# Patient Record
Sex: FEMALE | Race: WHITE | NOT HISPANIC OR LATINO | Employment: OTHER | URBAN - METROPOLITAN AREA
[De-identification: names, ages, dates, MRNs, and addresses within clinical notes are randomized per-mention and may not be internally consistent; named-entity substitution may affect disease eponyms.]

---

## 2017-01-19 ENCOUNTER — FOLLOW UP (OUTPATIENT)
Dept: URBAN - METROPOLITAN AREA CLINIC 26 | Facility: CLINIC | Age: 77
End: 2017-01-19

## 2017-01-19 VITALS
WEIGHT: 180 LBS | HEIGHT: 64 IN | BODY MASS INDEX: 30.73 KG/M2 | HEART RATE: 80 BPM | SYSTOLIC BLOOD PRESSURE: 128 MMHG | DIASTOLIC BLOOD PRESSURE: 62 MMHG

## 2017-01-19 DIAGNOSIS — H40.1130: ICD-10-CM

## 2017-01-19 DIAGNOSIS — H43.813: ICD-10-CM

## 2017-01-19 DIAGNOSIS — Z79.4: ICD-10-CM

## 2017-01-19 DIAGNOSIS — E11.3313: ICD-10-CM

## 2017-01-19 PROCEDURE — 92235 FLUORESCEIN ANGRPH MLTIFRAME: CPT

## 2017-01-19 PROCEDURE — 92250 FUNDUS PHOTOGRAPHY W/I&R: CPT

## 2017-01-19 PROCEDURE — 92014 COMPRE OPH EXAM EST PT 1/>: CPT

## 2017-01-19 PROCEDURE — G8397 DIL MACULA/FUNDUS EXAM/W DOC: HCPCS

## 2017-01-19 PROCEDURE — G8417 CALC BMI ABV UP PARAM F/U: HCPCS

## 2017-01-19 PROCEDURE — 2021F DILAT MACULAR EXAM DONE: CPT

## 2017-01-19 PROCEDURE — 1036F TOBACCO NON-USER: CPT

## 2017-01-19 PROCEDURE — 2027F OPTIC NERVE HEAD EVAL DONE: CPT

## 2017-01-19 PROCEDURE — 5010F MACUL RESULT PHY/QHP MNG DM: CPT

## 2017-01-19 PROCEDURE — G8427 DOCREV CUR MEDS BY ELIG CLIN: HCPCS

## 2017-01-19 PROCEDURE — 67210 TREATMENT OF RETINAL LESION: CPT

## 2017-01-19 PROCEDURE — 2022F DILAT RTA XM EVC RTNOPTHY: CPT

## 2017-01-19 ASSESSMENT — TONOMETRY
OD_IOP_MMHG: 11
OS_IOP_MMHG: 12

## 2017-01-19 ASSESSMENT — VISUAL ACUITY
OD_SC: 20/40+2
OS_SC: 20/40+2

## 2017-07-13 ENCOUNTER — FOLLOW UP (OUTPATIENT)
Dept: URBAN - METROPOLITAN AREA CLINIC 26 | Facility: CLINIC | Age: 77
End: 2017-07-13

## 2017-07-13 VITALS — HEIGHT: 55 IN | HEART RATE: 71 BPM | DIASTOLIC BLOOD PRESSURE: 60 MMHG | SYSTOLIC BLOOD PRESSURE: 143 MMHG

## 2017-07-13 DIAGNOSIS — E11.3313: ICD-10-CM

## 2017-07-13 DIAGNOSIS — H02.833: ICD-10-CM

## 2017-07-13 DIAGNOSIS — H43.813: ICD-10-CM

## 2017-07-13 DIAGNOSIS — H02.836: ICD-10-CM

## 2017-07-13 DIAGNOSIS — Z79.4: ICD-10-CM

## 2017-07-13 DIAGNOSIS — H40.1130: ICD-10-CM

## 2017-07-13 PROCEDURE — 92235 FLUORESCEIN ANGRPH MLTIFRAME: CPT

## 2017-07-13 PROCEDURE — 1036F TOBACCO NON-USER: CPT

## 2017-07-13 PROCEDURE — G8397 DIL MACULA/FUNDUS EXAM/W DOC: HCPCS

## 2017-07-13 PROCEDURE — 2022F DILAT RTA XM EVC RTNOPTHY: CPT

## 2017-07-13 PROCEDURE — G8427 DOCREV CUR MEDS BY ELIG CLIN: HCPCS

## 2017-07-13 PROCEDURE — 2027F OPTIC NERVE HEAD EVAL DONE: CPT

## 2017-07-13 PROCEDURE — G8417 CALC BMI ABV UP PARAM F/U: HCPCS

## 2017-07-13 PROCEDURE — 92014 COMPRE OPH EXAM EST PT 1/>: CPT

## 2017-07-13 PROCEDURE — 5010F MACUL RESULT PHY/QHP MNG DM: CPT

## 2017-07-13 PROCEDURE — 92134 CPTRZ OPH DX IMG PST SGM RTA: CPT

## 2017-07-13 PROCEDURE — 2021F DILAT MACULAR EXAM DONE: CPT

## 2017-07-13 PROCEDURE — 67210 TREATMENT OF RETINAL LESION: CPT

## 2017-07-13 PROCEDURE — 92250 FUNDUS PHOTOGRAPHY W/I&R: CPT

## 2017-07-13 ASSESSMENT — VISUAL ACUITY
OD_SC: 20/30+2
OS_SC: 20/25+2

## 2017-07-13 ASSESSMENT — TONOMETRY
OD_IOP_MMHG: 15
OS_IOP_MMHG: 14

## 2017-08-17 ENCOUNTER — CLINIC PROCEDURE ONLY (OUTPATIENT)
Dept: URBAN - METROPOLITAN AREA CLINIC 26 | Facility: CLINIC | Age: 77
End: 2017-08-17

## 2017-08-17 VITALS — SYSTOLIC BLOOD PRESSURE: 130 MMHG | DIASTOLIC BLOOD PRESSURE: 78 MMHG | HEIGHT: 55 IN | HEART RATE: 63 BPM

## 2017-08-17 DIAGNOSIS — E11.3313: ICD-10-CM

## 2017-08-17 PROCEDURE — 67210 TREATMENT OF RETINAL LESION: CPT

## 2017-08-17 ASSESSMENT — TONOMETRY
OS_IOP_MMHG: 13
OD_IOP_MMHG: 14

## 2017-08-17 ASSESSMENT — VISUAL ACUITY
OS_SC: 20/40-1
OD_SC: 20/25-2

## 2018-02-16 ENCOUNTER — FOLLOW UP (OUTPATIENT)
Dept: URBAN - METROPOLITAN AREA CLINIC 26 | Facility: CLINIC | Age: 78
End: 2018-02-16

## 2018-02-16 VITALS
HEIGHT: 64 IN | SYSTOLIC BLOOD PRESSURE: 160 MMHG | WEIGHT: 190 LBS | DIASTOLIC BLOOD PRESSURE: 87 MMHG | HEART RATE: 74 BPM | BODY MASS INDEX: 32.44 KG/M2

## 2018-02-16 DIAGNOSIS — E11.3313: ICD-10-CM

## 2018-02-16 DIAGNOSIS — H40.1130: ICD-10-CM

## 2018-02-16 DIAGNOSIS — Z79.4: ICD-10-CM

## 2018-02-16 DIAGNOSIS — H43.813: ICD-10-CM

## 2018-02-16 PROCEDURE — 92014 COMPRE OPH EXAM EST PT 1/>: CPT

## 2018-02-16 PROCEDURE — 92134 CPTRZ OPH DX IMG PST SGM RTA: CPT

## 2018-02-16 PROCEDURE — 67210 TREATMENT OF RETINAL LESION: CPT

## 2018-02-16 PROCEDURE — 92250 FUNDUS PHOTOGRAPHY W/I&R: CPT

## 2018-02-16 ASSESSMENT — VISUAL ACUITY
OD_SC: 20/20-2
OS_SC: 20/20+1

## 2018-02-16 ASSESSMENT — TONOMETRY
OS_IOP_MMHG: 14
OD_IOP_MMHG: 16

## 2018-05-31 ENCOUNTER — FOLLOW UP (OUTPATIENT)
Dept: URBAN - METROPOLITAN AREA CLINIC 26 | Facility: CLINIC | Age: 78
End: 2018-05-31

## 2018-05-31 DIAGNOSIS — E11.3313: ICD-10-CM

## 2018-05-31 DIAGNOSIS — H40.1130: ICD-10-CM

## 2018-05-31 DIAGNOSIS — Z79.4: ICD-10-CM

## 2018-05-31 DIAGNOSIS — H43.813: ICD-10-CM

## 2018-05-31 PROCEDURE — 92014 COMPRE OPH EXAM EST PT 1/>: CPT

## 2018-05-31 PROCEDURE — J2778** LUCENTIS 0.1MG

## 2018-05-31 PROCEDURE — 92250 FUNDUS PHOTOGRAPHY W/I&R: CPT

## 2018-05-31 PROCEDURE — 67028 INJECTION EYE DRUG: CPT

## 2018-05-31 PROCEDURE — 92134 CPTRZ OPH DX IMG PST SGM RTA: CPT

## 2018-05-31 ASSESSMENT — TONOMETRY
OS_IOP_MMHG: 13
OD_IOP_MMHG: 12

## 2018-05-31 ASSESSMENT — VISUAL ACUITY
OS_SC: 20/100-3
OD_SC: 20/30-1

## 2018-07-05 ENCOUNTER — FOLLOW UP AND POST INJECTION EVALUATION (OUTPATIENT)
Dept: URBAN - METROPOLITAN AREA CLINIC 26 | Facility: CLINIC | Age: 78
End: 2018-07-05

## 2018-07-05 DIAGNOSIS — Z79.4: ICD-10-CM

## 2018-07-05 DIAGNOSIS — H43.813: ICD-10-CM

## 2018-07-05 DIAGNOSIS — H40.1130: ICD-10-CM

## 2018-07-05 DIAGNOSIS — E11.3313: ICD-10-CM

## 2018-07-05 PROCEDURE — J2778** LUCENTIS 0.1MG

## 2018-07-05 PROCEDURE — 92250 FUNDUS PHOTOGRAPHY W/I&R: CPT

## 2018-07-05 PROCEDURE — 67028 INJECTION EYE DRUG: CPT

## 2018-07-05 PROCEDURE — 92014 COMPRE OPH EXAM EST PT 1/>: CPT

## 2018-07-05 PROCEDURE — 92134 CPTRZ OPH DX IMG PST SGM RTA: CPT

## 2018-07-05 ASSESSMENT — VISUAL ACUITY
OD_SC: 20/20
OS_PH: 20/400+2
OS_SC: 20/400

## 2018-07-05 ASSESSMENT — TONOMETRY
OS_IOP_MMHG: 11
OD_IOP_MMHG: 13

## 2018-08-10 ENCOUNTER — CLINICAL PROCEDURE AND DIAGNOSTIC TESTING ONLY (OUTPATIENT)
Dept: URBAN - METROPOLITAN AREA CLINIC 26 | Facility: CLINIC | Age: 78
End: 2018-08-10

## 2018-08-10 DIAGNOSIS — E11.3313: ICD-10-CM

## 2018-08-10 PROCEDURE — J2778** LUCENTIS 0.1MG

## 2018-08-10 PROCEDURE — 67028 INJECTION EYE DRUG: CPT

## 2018-08-10 PROCEDURE — 92134 CPTRZ OPH DX IMG PST SGM RTA: CPT

## 2018-08-10 ASSESSMENT — VISUAL ACUITY
OS_SC: 20/400
OD_SC: 20/25

## 2018-08-10 ASSESSMENT — TONOMETRY
OS_IOP_MMHG: 12
OD_IOP_MMHG: 12

## 2018-09-21 ENCOUNTER — CLINICAL PROCEDURE AND DIAGNOSTIC TESTING ONLY (OUTPATIENT)
Dept: URBAN - METROPOLITAN AREA CLINIC 26 | Facility: CLINIC | Age: 78
End: 2018-09-21

## 2018-09-21 DIAGNOSIS — E11.3313: ICD-10-CM

## 2018-09-21 PROCEDURE — 67028 INJECTION EYE DRUG: CPT

## 2018-09-21 PROCEDURE — 92134 CPTRZ OPH DX IMG PST SGM RTA: CPT

## 2018-09-21 ASSESSMENT — TONOMETRY
OS_IOP_MMHG: 15
OD_IOP_MMHG: 15

## 2018-09-21 ASSESSMENT — VISUAL ACUITY
OD_SC: 20/20-1
OS_SC: 20/80-2

## 2018-11-23 ENCOUNTER — CLINICAL PROCEDURE AND DIAGNOSTIC TESTING ONLY (OUTPATIENT)
Dept: URBAN - METROPOLITAN AREA CLINIC 26 | Facility: CLINIC | Age: 78
End: 2018-11-23

## 2018-11-23 DIAGNOSIS — E11.3313: ICD-10-CM

## 2018-11-23 PROCEDURE — 92134 CPTRZ OPH DX IMG PST SGM RTA: CPT

## 2018-11-23 PROCEDURE — 67028 INJECTION EYE DRUG: CPT

## 2018-11-23 PROCEDURE — J2778** LUCENTIS 0.1MG

## 2018-11-23 ASSESSMENT — VISUAL ACUITY
OS_SC: 20/400-2
OD_SC: 20/30

## 2018-11-23 ASSESSMENT — TONOMETRY
OD_IOP_MMHG: 15
OS_IOP_MMHG: 13

## 2018-12-28 ENCOUNTER — FOLLOW UP AND POST INJECTION EVALUATION (OUTPATIENT)
Dept: URBAN - METROPOLITAN AREA CLINIC 26 | Facility: CLINIC | Age: 78
End: 2018-12-28

## 2018-12-28 VITALS — HEART RATE: 72 BPM | HEIGHT: 55 IN | DIASTOLIC BLOOD PRESSURE: 84 MMHG | SYSTOLIC BLOOD PRESSURE: 160 MMHG

## 2018-12-28 DIAGNOSIS — H02.833: ICD-10-CM

## 2018-12-28 DIAGNOSIS — Z79.4: ICD-10-CM

## 2018-12-28 DIAGNOSIS — E11.3313: ICD-10-CM

## 2018-12-28 DIAGNOSIS — H43.813: ICD-10-CM

## 2018-12-28 DIAGNOSIS — H02.836: ICD-10-CM

## 2018-12-28 DIAGNOSIS — H40.1130: ICD-10-CM

## 2018-12-28 PROCEDURE — 92250 FUNDUS PHOTOGRAPHY W/I&R: CPT

## 2018-12-28 PROCEDURE — 67028 INJECTION EYE DRUG: CPT

## 2018-12-28 PROCEDURE — 92014 COMPRE OPH EXAM EST PT 1/>: CPT

## 2018-12-28 PROCEDURE — 92134 CPTRZ OPH DX IMG PST SGM RTA: CPT

## 2018-12-28 ASSESSMENT — VISUAL ACUITY
OD_SC: 20/20-2
OS_SC: 20/70+1

## 2018-12-28 ASSESSMENT — TONOMETRY
OS_IOP_MMHG: 14
OD_IOP_MMHG: 14

## 2019-02-01 ENCOUNTER — CLINICAL PROCEDURE AND DIAGNOSTIC TESTING ONLY (OUTPATIENT)
Dept: URBAN - METROPOLITAN AREA CLINIC 26 | Facility: CLINIC | Age: 79
End: 2019-02-01

## 2019-02-01 DIAGNOSIS — E11.3313: ICD-10-CM

## 2019-02-01 PROCEDURE — J2778** LUCENTIS 0.1MG

## 2019-02-01 PROCEDURE — 67028 INJECTION EYE DRUG: CPT

## 2019-02-01 PROCEDURE — 92134 CPTRZ OPH DX IMG PST SGM RTA: CPT

## 2019-02-01 ASSESSMENT — TONOMETRY
OD_IOP_MMHG: 16
OS_IOP_MMHG: 16

## 2019-02-01 ASSESSMENT — VISUAL ACUITY
OS_SC: 20/70
OD_SC: 20/25

## 2019-03-08 ENCOUNTER — CLINICAL PROCEDURE AND DIAGNOSTIC TESTING ONLY (OUTPATIENT)
Dept: URBAN - METROPOLITAN AREA CLINIC 26 | Facility: CLINIC | Age: 79
End: 2019-03-08

## 2019-03-08 DIAGNOSIS — E11.3313: ICD-10-CM

## 2019-03-08 PROCEDURE — 92134 CPTRZ OPH DX IMG PST SGM RTA: CPT

## 2019-03-08 PROCEDURE — 67028 INJECTION EYE DRUG: CPT

## 2019-03-08 PROCEDURE — J2778** LUCENTIS 0.1MG

## 2019-03-08 ASSESSMENT — VISUAL ACUITY
OS_SC: 20/30+1
OD_SC: 20/20-2

## 2019-03-08 ASSESSMENT — TONOMETRY
OD_IOP_MMHG: 14
OS_IOP_MMHG: 15

## 2019-04-12 ENCOUNTER — CLINICAL PROCEDURE AND DIAGNOSTIC TESTING ONLY (OUTPATIENT)
Dept: URBAN - METROPOLITAN AREA CLINIC 26 | Facility: CLINIC | Age: 79
End: 2019-04-12

## 2019-04-12 DIAGNOSIS — E11.3313: ICD-10-CM

## 2019-04-12 PROCEDURE — 92250 FUNDUS PHOTOGRAPHY W/I&R: CPT

## 2019-04-12 PROCEDURE — J2778** LUCENTIS 0.1MG

## 2019-04-12 PROCEDURE — 67028 INJECTION EYE DRUG: CPT

## 2019-04-12 ASSESSMENT — TONOMETRY
OD_IOP_MMHG: 12
OS_IOP_MMHG: 14

## 2019-04-12 ASSESSMENT — VISUAL ACUITY
OS_SC: 20/30+2
OD_SC: 20/20-2

## 2019-05-16 ENCOUNTER — FOLLOW UP AND POST INJECTION EVALUATION (OUTPATIENT)
Dept: URBAN - METROPOLITAN AREA CLINIC 26 | Facility: CLINIC | Age: 79
End: 2019-05-16

## 2019-05-16 DIAGNOSIS — Z79.4: ICD-10-CM

## 2019-05-16 DIAGNOSIS — H40.1130: ICD-10-CM

## 2019-05-16 DIAGNOSIS — E11.3313: ICD-10-CM

## 2019-05-16 DIAGNOSIS — H43.813: ICD-10-CM

## 2019-05-16 PROCEDURE — 92014 COMPRE OPH EXAM EST PT 1/>: CPT

## 2019-05-16 PROCEDURE — 92250 FUNDUS PHOTOGRAPHY W/I&R: CPT

## 2019-05-16 PROCEDURE — J2778** LUCENTIS 0.1MG

## 2019-05-16 PROCEDURE — 67028 INJECTION EYE DRUG: CPT

## 2019-05-16 PROCEDURE — 92134 CPTRZ OPH DX IMG PST SGM RTA: CPT

## 2019-05-16 ASSESSMENT — TONOMETRY
OD_IOP_MMHG: 15
OS_IOP_MMHG: 16

## 2019-05-16 ASSESSMENT — VISUAL ACUITY
OD_SC: 20/20-1
OS_SC: 20/40

## 2019-05-23 ENCOUNTER — CLINIC PROCEDURE ONLY (OUTPATIENT)
Dept: URBAN - METROPOLITAN AREA CLINIC 26 | Facility: CLINIC | Age: 79
End: 2019-05-23

## 2019-05-23 DIAGNOSIS — E11.3313: ICD-10-CM

## 2019-05-23 PROCEDURE — 67210 TREATMENT OF RETINAL LESION: CPT

## 2019-05-23 ASSESSMENT — VISUAL ACUITY
OD_SC: 20/20-2
OS_SC: 20/40

## 2019-05-23 ASSESSMENT — TONOMETRY
OD_IOP_MMHG: 14
OS_IOP_MMHG: 12

## 2019-06-11 NOTE — PATIENT DISCUSSION
New Prescription: Lotemax (loteprednol etabonate): gel: 0.5% 1 drop four times a day into both eyes 06-

## 2019-06-11 NOTE — PATIENT DISCUSSION
POSTERIOR VITREOUS DETACHMENT,OU: NO RETINAL HOLES OR TEARS ON SLIT LAMP AND INDIRECT OPHTHALMOSCOPY. RD PRECAUTIONS DISCUSSED. CALLBACK INSTRUCTIONS GIVEN. RETURN FOR FOLLOW-UP AS SCHEDULED.

## 2019-06-11 NOTE — PATIENT DISCUSSION
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 ADD NIGHTLY LUBRICATING OINTMENT OR GEL. RETURN FOR FOLLOW-UP AS SCHEDULED OR SOONER IF SYMPTOMS WORSEN.

## 2019-07-12 ENCOUNTER — CLINICAL PROCEDURE AND DIAGNOSTIC TESTING ONLY (OUTPATIENT)
Dept: URBAN - METROPOLITAN AREA CLINIC 26 | Facility: CLINIC | Age: 79
End: 2019-07-12

## 2019-07-12 DIAGNOSIS — E11.3313: ICD-10-CM

## 2019-07-12 DIAGNOSIS — H40.1130: ICD-10-CM

## 2019-07-12 PROCEDURE — 67028 INJECTION EYE DRUG: CPT

## 2019-07-12 PROCEDURE — 92134 CPTRZ OPH DX IMG PST SGM RTA: CPT

## 2019-07-12 PROCEDURE — 92250 FUNDUS PHOTOGRAPHY W/I&R: CPT

## 2019-07-12 PROCEDURE — J2778** LUCENTIS 0.1MG

## 2019-07-12 ASSESSMENT — TONOMETRY
OD_IOP_MMHG: 17
OS_IOP_MMHG: 14

## 2019-07-12 ASSESSMENT — VISUAL ACUITY
OS_SC: 20/30+2
OD_SC: 20/25-1

## 2019-08-16 ENCOUNTER — CLINICAL PROCEDURE AND DIAGNOSTIC TESTING ONLY (OUTPATIENT)
Dept: URBAN - METROPOLITAN AREA CLINIC 26 | Facility: CLINIC | Age: 79
End: 2019-08-16

## 2019-08-16 DIAGNOSIS — E11.3313: ICD-10-CM

## 2019-08-16 PROCEDURE — 92250 FUNDUS PHOTOGRAPHY W/I&R: CPT

## 2019-08-16 PROCEDURE — 67028 INJECTION EYE DRUG: CPT

## 2019-08-16 PROCEDURE — J2778** LUCENTIS 0.1MG

## 2019-08-16 ASSESSMENT — VISUAL ACUITY
OD_SC: 20/25+1
OS_SC: 20/25

## 2019-08-16 ASSESSMENT — TONOMETRY
OD_IOP_MMHG: 10
OS_IOP_MMHG: 13

## 2019-09-24 ENCOUNTER — CLINICAL PROCEDURE AND DIAGNOSTIC TESTING ONLY (OUTPATIENT)
Dept: URBAN - METROPOLITAN AREA CLINIC 26 | Facility: CLINIC | Age: 79
End: 2019-09-24

## 2019-09-24 DIAGNOSIS — H40.1130: ICD-10-CM

## 2019-09-24 DIAGNOSIS — E11.3313: ICD-10-CM

## 2019-09-24 PROCEDURE — 92250 FUNDUS PHOTOGRAPHY W/I&R: CPT

## 2019-09-24 PROCEDURE — 92134 CPTRZ OPH DX IMG PST SGM RTA: CPT

## 2019-09-24 PROCEDURE — 67028 INJECTION EYE DRUG: CPT

## 2019-09-24 PROCEDURE — J2778** LUCENTIS 0.1MG

## 2019-09-24 ASSESSMENT — TONOMETRY
OS_IOP_MMHG: 11
OD_IOP_MMHG: 12

## 2019-09-24 ASSESSMENT — VISUAL ACUITY
OS_SC: 20/25
OD_SC: 20/20

## 2019-11-19 ENCOUNTER — CLINICAL PROCEDURE AND DIAGNOSTIC TESTING ONLY (OUTPATIENT)
Dept: URBAN - METROPOLITAN AREA CLINIC 26 | Facility: CLINIC | Age: 79
End: 2019-11-19

## 2019-11-19 DIAGNOSIS — E11.3313: ICD-10-CM

## 2019-11-19 PROCEDURE — 92250 FUNDUS PHOTOGRAPHY W/I&R: CPT

## 2019-11-19 PROCEDURE — 67028 INJECTION EYE DRUG: CPT

## 2019-11-19 ASSESSMENT — VISUAL ACUITY
OS_SC: 20/40
OD_SC: 20/30-1

## 2019-11-19 ASSESSMENT — TONOMETRY
OD_IOP_MMHG: 13
OS_IOP_MMHG: 14

## 2019-12-27 ENCOUNTER — CLINICAL PROCEDURE AND DIAGNOSTIC TESTING ONLY (OUTPATIENT)
Dept: URBAN - METROPOLITAN AREA CLINIC 26 | Facility: CLINIC | Age: 79
End: 2019-12-27

## 2019-12-27 DIAGNOSIS — E11.3313: ICD-10-CM

## 2019-12-27 PROCEDURE — 92134 CPTRZ OPH DX IMG PST SGM RTA: CPT

## 2019-12-27 PROCEDURE — 92250 FUNDUS PHOTOGRAPHY W/I&R: CPT

## 2019-12-27 PROCEDURE — 67028 INJECTION EYE DRUG: CPT

## 2019-12-27 ASSESSMENT — VISUAL ACUITY
OS_SC: 20/30+1
OD_SC: 20/30+1

## 2019-12-27 ASSESSMENT — TONOMETRY
OD_IOP_MMHG: 18
OS_IOP_MMHG: 17

## 2020-02-07 ENCOUNTER — CLINICAL PROCEDURE AND DIAGNOSTIC TESTING ONLY (OUTPATIENT)
Dept: URBAN - METROPOLITAN AREA CLINIC 26 | Facility: CLINIC | Age: 80
End: 2020-02-07

## 2020-02-07 DIAGNOSIS — E11.3313: ICD-10-CM

## 2020-02-07 PROCEDURE — 92134 CPTRZ OPH DX IMG PST SGM RTA: CPT

## 2020-02-07 PROCEDURE — 67028 INJECTION EYE DRUG: CPT

## 2020-02-07 PROCEDURE — 92250 FUNDUS PHOTOGRAPHY W/I&R: CPT

## 2020-02-07 ASSESSMENT — VISUAL ACUITY
OD_SC: 20/30+1
OS_SC: 20/40

## 2020-02-07 ASSESSMENT — TONOMETRY
OD_IOP_MMHG: 15
OS_IOP_MMHG: 13

## 2020-03-27 ENCOUNTER — FOLLOW UP AND POST INJECTION EVALUATION (OUTPATIENT)
Dept: URBAN - METROPOLITAN AREA CLINIC 26 | Facility: CLINIC | Age: 80
End: 2020-03-27

## 2020-03-27 VITALS — WEIGHT: 176 LBS | BODY MASS INDEX: 30.05 KG/M2 | HEIGHT: 64 IN

## 2020-03-27 DIAGNOSIS — H40.1130: ICD-10-CM

## 2020-03-27 DIAGNOSIS — H43.813: ICD-10-CM

## 2020-03-27 DIAGNOSIS — Z79.4: ICD-10-CM

## 2020-03-27 DIAGNOSIS — E11.3313: ICD-10-CM

## 2020-03-27 PROCEDURE — 92014 COMPRE OPH EXAM EST PT 1/>: CPT

## 2020-03-27 PROCEDURE — 92134 CPTRZ OPH DX IMG PST SGM RTA: CPT

## 2020-03-27 PROCEDURE — 67210 TREATMENT OF RETINAL LESION: CPT

## 2020-03-27 PROCEDURE — 92250 FUNDUS PHOTOGRAPHY W/I&R: CPT

## 2020-03-27 ASSESSMENT — VISUAL ACUITY
OS_SC: 20/30
OD_SC: 20/20-2

## 2020-03-27 ASSESSMENT — TONOMETRY
OS_IOP_MMHG: 12
OD_IOP_MMHG: 15

## 2020-04-02 ENCOUNTER — CLINIC PROCEDURE ONLY (OUTPATIENT)
Dept: URBAN - METROPOLITAN AREA CLINIC 26 | Facility: CLINIC | Age: 80
End: 2020-04-02

## 2020-04-02 DIAGNOSIS — E11.3313: ICD-10-CM

## 2020-04-02 PROCEDURE — 67210 TREATMENT OF RETINAL LESION: CPT

## 2020-04-02 ASSESSMENT — VISUAL ACUITY
OD_SC: 20/25
OS_SC: 20/30+2

## 2020-04-02 ASSESSMENT — TONOMETRY
OD_IOP_MMHG: 19
OS_IOP_MMHG: 18

## 2020-08-13 ENCOUNTER — FOLLOW UP (OUTPATIENT)
Dept: URBAN - METROPOLITAN AREA CLINIC 26 | Facility: CLINIC | Age: 80
End: 2020-08-13

## 2020-08-13 DIAGNOSIS — H43.813: ICD-10-CM

## 2020-08-13 DIAGNOSIS — E11.3313: ICD-10-CM

## 2020-08-13 DIAGNOSIS — Z79.4: ICD-10-CM

## 2020-08-13 DIAGNOSIS — H40.1130: ICD-10-CM

## 2020-08-13 PROCEDURE — 67028 INJECTION EYE DRUG: CPT

## 2020-08-13 PROCEDURE — 92250 FUNDUS PHOTOGRAPHY W/I&R: CPT

## 2020-08-13 PROCEDURE — 92014 COMPRE OPH EXAM EST PT 1/>: CPT

## 2020-08-13 ASSESSMENT — TONOMETRY
OD_IOP_MMHG: 12
OS_IOP_MMHG: 13

## 2020-08-13 ASSESSMENT — VISUAL ACUITY
OS_SC: 20/30-1
OD_SC: 20/25+1

## 2020-11-13 ENCOUNTER — FOLLOW UP AND POST INJECTION EVALUATION (OUTPATIENT)
Dept: URBAN - METROPOLITAN AREA CLINIC 26 | Facility: CLINIC | Age: 80
End: 2020-11-13

## 2020-11-13 DIAGNOSIS — H43.813: ICD-10-CM

## 2020-11-13 DIAGNOSIS — Z79.4: ICD-10-CM

## 2020-11-13 DIAGNOSIS — H40.1130: ICD-10-CM

## 2020-11-13 DIAGNOSIS — E11.3313: ICD-10-CM

## 2020-11-13 DIAGNOSIS — H02.836: ICD-10-CM

## 2020-11-13 DIAGNOSIS — H02.833: ICD-10-CM

## 2020-11-13 PROCEDURE — 92250 FUNDUS PHOTOGRAPHY W/I&R: CPT

## 2020-11-13 PROCEDURE — 67028 INJECTION EYE DRUG: CPT

## 2020-11-13 PROCEDURE — 92012 INTRM OPH EXAM EST PATIENT: CPT

## 2020-11-13 PROCEDURE — 92134 CPTRZ OPH DX IMG PST SGM RTA: CPT

## 2020-11-13 ASSESSMENT — VISUAL ACUITY
OS_SC: 20/25+1
OD_SC: 20/25+1

## 2020-11-13 ASSESSMENT — TONOMETRY
OS_IOP_MMHG: 20
OD_IOP_MMHG: 20

## 2020-12-24 ENCOUNTER — CLINICAL PROCEDURE AND DIAGNOSTIC TESTING ONLY (OUTPATIENT)
Dept: URBAN - METROPOLITAN AREA CLINIC 26 | Facility: CLINIC | Age: 80
End: 2020-12-24

## 2020-12-24 DIAGNOSIS — E11.3313: ICD-10-CM

## 2020-12-24 PROCEDURE — 67028 INJECTION EYE DRUG: CPT

## 2020-12-24 PROCEDURE — 92134 CPTRZ OPH DX IMG PST SGM RTA: CPT

## 2020-12-24 PROCEDURE — 92250 FUNDUS PHOTOGRAPHY W/I&R: CPT

## 2020-12-24 ASSESSMENT — VISUAL ACUITY
OD_SC: 20/25-1
OS_SC: 20/25

## 2020-12-24 ASSESSMENT — TONOMETRY
OD_IOP_MMHG: 18
OS_IOP_MMHG: 13

## 2021-02-05 ENCOUNTER — CLINICAL PROCEDURE AND DIAGNOSTIC TESTING ONLY (OUTPATIENT)
Dept: URBAN - METROPOLITAN AREA CLINIC 26 | Facility: CLINIC | Age: 81
End: 2021-02-05

## 2021-02-05 DIAGNOSIS — E11.3313: ICD-10-CM

## 2021-02-05 PROCEDURE — 67028 INJECTION EYE DRUG: CPT

## 2021-02-05 PROCEDURE — 92134 CPTRZ OPH DX IMG PST SGM RTA: CPT

## 2021-02-05 PROCEDURE — 92250 FUNDUS PHOTOGRAPHY W/I&R: CPT

## 2021-03-12 ENCOUNTER — FOLLOW UP AND POST INJECTION EVALUATION (OUTPATIENT)
Dept: URBAN - METROPOLITAN AREA CLINIC 26 | Facility: CLINIC | Age: 81
End: 2021-03-12

## 2021-03-12 VITALS — BODY MASS INDEX: 29.71 KG/M2 | WEIGHT: 174 LBS | HEIGHT: 64 IN

## 2021-03-12 DIAGNOSIS — H43.813: ICD-10-CM

## 2021-03-12 DIAGNOSIS — Z79.4: ICD-10-CM

## 2021-03-12 DIAGNOSIS — H40.1130: ICD-10-CM

## 2021-03-12 DIAGNOSIS — E11.3313: ICD-10-CM

## 2021-03-12 PROCEDURE — 92250 FUNDUS PHOTOGRAPHY W/I&R: CPT

## 2021-03-12 PROCEDURE — 92014 COMPRE OPH EXAM EST PT 1/>: CPT

## 2021-03-12 PROCEDURE — 67210 TREATMENT OF RETINAL LESION: CPT

## 2021-03-12 PROCEDURE — 92134 CPTRZ OPH DX IMG PST SGM RTA: CPT

## 2021-03-12 ASSESSMENT — VISUAL ACUITY
OS_SC: 20/30+2
OD_SC: 20/30

## 2021-03-12 ASSESSMENT — TONOMETRY
OS_IOP_MMHG: 13
OD_IOP_MMHG: 17

## 2021-04-16 ENCOUNTER — CLINICAL PROCEDURE AND DIAGNOSTIC TESTING ONLY (OUTPATIENT)
Dept: URBAN - METROPOLITAN AREA CLINIC 26 | Facility: CLINIC | Age: 81
End: 2021-04-16

## 2021-04-16 DIAGNOSIS — E11.3313: ICD-10-CM

## 2021-04-16 PROCEDURE — 92134 CPTRZ OPH DX IMG PST SGM RTA: CPT

## 2021-04-16 PROCEDURE — 67028 INJECTION EYE DRUG: CPT

## 2021-04-16 PROCEDURE — 92250 FUNDUS PHOTOGRAPHY W/I&R: CPT

## 2021-05-21 ENCOUNTER — CLINICAL PROCEDURE AND DIAGNOSTIC TESTING ONLY (OUTPATIENT)
Dept: URBAN - METROPOLITAN AREA CLINIC 26 | Facility: CLINIC | Age: 81
End: 2021-05-21

## 2021-05-21 DIAGNOSIS — E11.3313: ICD-10-CM

## 2021-05-21 DIAGNOSIS — H40.1130: ICD-10-CM

## 2021-05-21 PROCEDURE — 92250 FUNDUS PHOTOGRAPHY W/I&R: CPT

## 2021-05-21 PROCEDURE — 67028 INJECTION EYE DRUG: CPT

## 2021-06-29 ENCOUNTER — CLINICAL PROCEDURE AND DIAGNOSTIC TESTING ONLY (OUTPATIENT)
Dept: URBAN - METROPOLITAN AREA CLINIC 26 | Facility: CLINIC | Age: 81
End: 2021-06-29

## 2021-06-29 DIAGNOSIS — E11.3313: ICD-10-CM

## 2021-06-29 PROCEDURE — 67028 INJECTION EYE DRUG: CPT

## 2021-06-29 PROCEDURE — 92134 CPTRZ OPH DX IMG PST SGM RTA: CPT

## 2021-06-29 PROCEDURE — 92250 FUNDUS PHOTOGRAPHY W/I&R: CPT

## 2021-06-29 ASSESSMENT — TONOMETRY
OS_IOP_MMHG: 15
OD_IOP_MMHG: 14

## 2021-08-10 ENCOUNTER — CLINICAL PROCEDURE AND DIAGNOSTIC TESTING ONLY (OUTPATIENT)
Dept: URBAN - METROPOLITAN AREA CLINIC 26 | Facility: CLINIC | Age: 81
End: 2021-08-10

## 2021-08-10 DIAGNOSIS — E11.3313: ICD-10-CM

## 2021-08-10 PROCEDURE — 92134 CPTRZ OPH DX IMG PST SGM RTA: CPT

## 2021-08-10 PROCEDURE — 67028 INJECTION EYE DRUG: CPT

## 2021-08-10 PROCEDURE — 92250 FUNDUS PHOTOGRAPHY W/I&R: CPT

## 2021-09-24 ENCOUNTER — CLINICAL PROCEDURE AND DIAGNOSTIC TESTING ONLY (OUTPATIENT)
Dept: URBAN - METROPOLITAN AREA CLINIC 26 | Facility: CLINIC | Age: 81
End: 2021-09-24

## 2021-09-24 DIAGNOSIS — E11.3311: ICD-10-CM

## 2021-09-24 DIAGNOSIS — E11.3312: ICD-10-CM

## 2021-09-24 PROCEDURE — 92134 CPTRZ OPH DX IMG PST SGM RTA: CPT

## 2021-09-24 PROCEDURE — 67028 INJECTION EYE DRUG: CPT

## 2021-11-05 ENCOUNTER — CLINICAL PROCEDURE AND DIAGNOSTIC TESTING ONLY (OUTPATIENT)
Dept: URBAN - METROPOLITAN AREA CLINIC 26 | Facility: CLINIC | Age: 81
End: 2021-11-05

## 2021-11-05 DIAGNOSIS — E11.3311: ICD-10-CM

## 2021-11-05 DIAGNOSIS — E11.3312: ICD-10-CM

## 2021-11-05 PROCEDURE — 92134 CPTRZ OPH DX IMG PST SGM RTA: CPT

## 2021-11-05 PROCEDURE — 67028 INJECTION EYE DRUG: CPT

## 2021-11-05 PROCEDURE — 92250 FUNDUS PHOTOGRAPHY W/I&R: CPT

## 2021-12-17 ENCOUNTER — CLINIC PROCEDURE ONLY (OUTPATIENT)
Dept: URBAN - METROPOLITAN AREA CLINIC 26 | Facility: CLINIC | Age: 81
End: 2021-12-17

## 2021-12-17 DIAGNOSIS — E11.3311: ICD-10-CM

## 2021-12-17 DIAGNOSIS — E11.3312: ICD-10-CM

## 2021-12-17 DIAGNOSIS — Z79.4: ICD-10-CM

## 2021-12-17 PROCEDURE — 67028 INJECTION EYE DRUG: CPT

## 2021-12-17 PROCEDURE — 92134 CPTRZ OPH DX IMG PST SGM RTA: CPT

## 2022-01-28 ENCOUNTER — CLINIC PROCEDURE ONLY (OUTPATIENT)
Dept: URBAN - METROPOLITAN AREA CLINIC 26 | Facility: CLINIC | Age: 82
End: 2022-01-28

## 2022-01-28 VITALS — HEIGHT: 64 IN | WEIGHT: 160 LBS | BODY MASS INDEX: 27.31 KG/M2

## 2022-01-28 DIAGNOSIS — E11.3312: ICD-10-CM

## 2022-01-28 DIAGNOSIS — H40.1130: ICD-10-CM

## 2022-01-28 DIAGNOSIS — E11.3311: ICD-10-CM

## 2022-01-28 PROCEDURE — 92250 FUNDUS PHOTOGRAPHY W/I&R: CPT

## 2022-01-28 PROCEDURE — 67028 INJECTION EYE DRUG: CPT

## 2022-01-28 PROCEDURE — 92134 CPTRZ OPH DX IMG PST SGM RTA: CPT

## 2022-03-04 ENCOUNTER — CLINIC PROCEDURE ONLY (OUTPATIENT)
Dept: URBAN - METROPOLITAN AREA CLINIC 26 | Facility: CLINIC | Age: 82
End: 2022-03-04

## 2022-03-04 DIAGNOSIS — E11.3311: ICD-10-CM

## 2022-03-04 DIAGNOSIS — E11.3312: ICD-10-CM

## 2022-03-04 PROCEDURE — 67028 INJECTION EYE DRUG: CPT

## 2022-03-04 PROCEDURE — 92134 CPTRZ OPH DX IMG PST SGM RTA: CPT

## 2022-03-04 PROCEDURE — 92250 FUNDUS PHOTOGRAPHY W/I&R: CPT

## 2022-04-14 ENCOUNTER — CLINIC PROCEDURE ONLY (OUTPATIENT)
Dept: URBAN - METROPOLITAN AREA CLINIC 26 | Facility: CLINIC | Age: 82
End: 2022-04-14

## 2022-04-14 DIAGNOSIS — E11.3313: ICD-10-CM

## 2022-04-14 DIAGNOSIS — Z79.4: ICD-10-CM

## 2022-04-14 PROCEDURE — 92134 CPTRZ OPH DX IMG PST SGM RTA: CPT

## 2022-04-14 PROCEDURE — 67028 INJECTION EYE DRUG: CPT

## 2022-06-09 ENCOUNTER — CLINIC PROCEDURE ONLY (OUTPATIENT)
Dept: URBAN - METROPOLITAN AREA CLINIC 26 | Facility: CLINIC | Age: 82
End: 2022-06-09

## 2022-06-09 DIAGNOSIS — E11.3313: ICD-10-CM

## 2022-06-09 DIAGNOSIS — Z79.4: ICD-10-CM

## 2022-06-09 PROCEDURE — 92250 FUNDUS PHOTOGRAPHY W/I&R: CPT

## 2022-06-09 PROCEDURE — 67028 INJECTION EYE DRUG: CPT

## 2022-06-09 PROCEDURE — 92134 CPTRZ OPH DX IMG PST SGM RTA: CPT

## 2022-08-12 ENCOUNTER — CLINIC PROCEDURE ONLY (OUTPATIENT)
Dept: URBAN - METROPOLITAN AREA CLINIC 26 | Facility: CLINIC | Age: 82
End: 2022-08-12

## 2022-08-12 DIAGNOSIS — E11.3313: ICD-10-CM

## 2022-08-12 DIAGNOSIS — Z79.4: ICD-10-CM

## 2022-08-12 PROCEDURE — 92134 CPTRZ OPH DX IMG PST SGM RTA: CPT

## 2022-08-12 PROCEDURE — 92250 FUNDUS PHOTOGRAPHY W/I&R: CPT

## 2022-08-12 PROCEDURE — 67028 INJECTION EYE DRUG: CPT

## 2022-09-23 ENCOUNTER — FOLLOW UP (OUTPATIENT)
Dept: URBAN - METROPOLITAN AREA CLINIC 26 | Facility: CLINIC | Age: 82
End: 2022-09-23

## 2022-09-23 VITALS
DIASTOLIC BLOOD PRESSURE: 90 MMHG | SYSTOLIC BLOOD PRESSURE: 148 MMHG | BODY MASS INDEX: 27.31 KG/M2 | HEART RATE: 66 BPM | HEIGHT: 64 IN | WEIGHT: 160 LBS

## 2022-09-23 DIAGNOSIS — Z79.4: ICD-10-CM

## 2022-09-23 DIAGNOSIS — H43.813: ICD-10-CM

## 2022-09-23 DIAGNOSIS — E11.3313: ICD-10-CM

## 2022-09-23 PROCEDURE — 92134 CPTRZ OPH DX IMG PST SGM RTA: CPT

## 2022-09-23 PROCEDURE — 67210 TREATMENT OF RETINAL LESION: CPT

## 2022-09-23 PROCEDURE — 92014 COMPRE OPH EXAM EST PT 1/>: CPT

## 2022-09-23 ASSESSMENT — TONOMETRY
OS_IOP_MMHG: 12
OD_IOP_MMHG: 12

## 2022-09-23 ASSESSMENT — VISUAL ACUITY
OS_SC: 20/30
OD_SC: 20/30-1

## 2022-10-07 ENCOUNTER — CLINIC PROCEDURE ONLY (OUTPATIENT)
Dept: URBAN - METROPOLITAN AREA CLINIC 26 | Facility: CLINIC | Age: 82
End: 2022-10-07

## 2022-10-07 DIAGNOSIS — Z79.4: ICD-10-CM

## 2022-10-07 DIAGNOSIS — E11.3313: ICD-10-CM

## 2022-10-07 PROCEDURE — 92250 FUNDUS PHOTOGRAPHY W/I&R: CPT

## 2022-10-07 PROCEDURE — 67028 INJECTION EYE DRUG: CPT

## 2022-11-11 ENCOUNTER — CLINIC PROCEDURE ONLY (OUTPATIENT)
Dept: URBAN - METROPOLITAN AREA CLINIC 26 | Facility: CLINIC | Age: 82
End: 2022-11-11

## 2022-11-11 DIAGNOSIS — E11.3313: ICD-10-CM

## 2022-11-11 DIAGNOSIS — Z79.4: ICD-10-CM

## 2022-11-11 PROCEDURE — 67028 INJECTION EYE DRUG: CPT

## 2022-11-11 PROCEDURE — 92134 CPTRZ OPH DX IMG PST SGM RTA: CPT

## 2022-12-16 ENCOUNTER — CLINIC PROCEDURE ONLY (OUTPATIENT)
Dept: URBAN - METROPOLITAN AREA CLINIC 26 | Facility: CLINIC | Age: 82
End: 2022-12-16

## 2022-12-16 PROCEDURE — 67028 INJECTION EYE DRUG: CPT

## 2022-12-16 PROCEDURE — 92134 CPTRZ OPH DX IMG PST SGM RTA: CPT

## 2023-01-20 ENCOUNTER — COMPREHENSIVE EXAM (OUTPATIENT)
Dept: URBAN - METROPOLITAN AREA CLINIC 26 | Facility: CLINIC | Age: 83
End: 2023-01-20

## 2023-01-20 VITALS
HEIGHT: 64 IN | SYSTOLIC BLOOD PRESSURE: 140 MMHG | BODY MASS INDEX: 27.66 KG/M2 | HEART RATE: 53 BPM | WEIGHT: 162 LBS | DIASTOLIC BLOOD PRESSURE: 82 MMHG

## 2023-01-20 DIAGNOSIS — H43.813: ICD-10-CM

## 2023-01-20 DIAGNOSIS — H02.833: ICD-10-CM

## 2023-01-20 DIAGNOSIS — H02.836: ICD-10-CM

## 2023-01-20 DIAGNOSIS — Z79.4: ICD-10-CM

## 2023-01-20 DIAGNOSIS — E11.3313: ICD-10-CM

## 2023-01-20 DIAGNOSIS — H40.1130: ICD-10-CM

## 2023-01-20 PROCEDURE — 92014 COMPRE OPH EXAM EST PT 1/>: CPT

## 2023-01-20 PROCEDURE — 92134 CPTRZ OPH DX IMG PST SGM RTA: CPT

## 2023-01-20 PROCEDURE — 67028 INJECTION EYE DRUG: CPT

## 2023-01-20 PROCEDURE — 92250 FUNDUS PHOTOGRAPHY W/I&R: CPT

## 2023-01-20 ASSESSMENT — VISUAL ACUITY
OD_SC: 20/20-1
OS_SC: 20/40

## 2023-01-20 ASSESSMENT — TONOMETRY
OD_IOP_MMHG: 10
OS_IOP_MMHG: 10

## 2023-05-01 ENCOUNTER — CLINIC PROCEDURE ONLY (OUTPATIENT)
Dept: URBAN - METROPOLITAN AREA CLINIC 26 | Facility: CLINIC | Age: 83
End: 2023-05-01

## 2023-05-01 DIAGNOSIS — E11.3313: ICD-10-CM

## 2023-05-01 PROCEDURE — 92134 CPTRZ OPH DX IMG PST SGM RTA: CPT

## 2023-05-01 PROCEDURE — 67028 INJECTION EYE DRUG: CPT

## 2023-05-01 ASSESSMENT — TONOMETRY: OS_IOP_MMHG: 18

## 2023-06-08 ENCOUNTER — CLINIC PROCEDURE ONLY (OUTPATIENT)
Dept: URBAN - METROPOLITAN AREA CLINIC 26 | Facility: CLINIC | Age: 83
End: 2023-06-08

## 2023-06-08 DIAGNOSIS — E11.3313: ICD-10-CM

## 2023-06-08 DIAGNOSIS — H02.836: ICD-10-CM

## 2023-06-08 DIAGNOSIS — H43.813: ICD-10-CM

## 2023-06-08 DIAGNOSIS — Z79.4: ICD-10-CM

## 2023-06-08 DIAGNOSIS — H40.1130: ICD-10-CM

## 2023-06-08 DIAGNOSIS — H02.833: ICD-10-CM

## 2023-06-08 PROCEDURE — 92134 CPTRZ OPH DX IMG PST SGM RTA: CPT

## 2023-06-08 PROCEDURE — 92014 COMPRE OPH EXAM EST PT 1/>: CPT

## 2023-06-08 PROCEDURE — 67028 INJECTION EYE DRUG: CPT

## 2023-06-08 ASSESSMENT — VISUAL ACUITY: OS_SC: 20/100-2

## 2023-06-08 ASSESSMENT — TONOMETRY: OS_IOP_MMHG: 13

## 2023-07-14 ENCOUNTER — CLINIC PROCEDURE ONLY (OUTPATIENT)
Dept: URBAN - METROPOLITAN AREA CLINIC 26 | Facility: CLINIC | Age: 83
End: 2023-07-14

## 2023-07-14 DIAGNOSIS — E11.3313: ICD-10-CM

## 2023-07-14 DIAGNOSIS — Z79.4: ICD-10-CM

## 2023-07-14 PROCEDURE — 67028 INJECTION EYE DRUG: CPT

## 2023-07-14 PROCEDURE — 92134 CPTRZ OPH DX IMG PST SGM RTA: CPT

## 2023-07-14 PROCEDURE — 92250 FUNDUS PHOTOGRAPHY W/I&R: CPT

## 2023-08-17 ENCOUNTER — CLINIC PROCEDURE ONLY (OUTPATIENT)
Dept: URBAN - METROPOLITAN AREA CLINIC 26 | Facility: CLINIC | Age: 83
End: 2023-08-17

## 2023-08-17 DIAGNOSIS — E11.3313: ICD-10-CM

## 2023-08-17 DIAGNOSIS — Z79.4: ICD-10-CM

## 2023-08-17 PROCEDURE — 92250 FUNDUS PHOTOGRAPHY W/I&R: CPT

## 2023-08-17 PROCEDURE — 67028 INJECTION EYE DRUG: CPT

## 2023-08-17 PROCEDURE — 92134 CPTRZ OPH DX IMG PST SGM RTA: CPT

## 2023-10-09 ENCOUNTER — CLINIC PROCEDURE ONLY (OUTPATIENT)
Dept: URBAN - METROPOLITAN AREA CLINIC 26 | Facility: CLINIC | Age: 83
End: 2023-10-09

## 2023-10-09 DIAGNOSIS — Z79.4: ICD-10-CM

## 2023-10-09 DIAGNOSIS — E11.3313: ICD-10-CM

## 2023-10-09 PROCEDURE — 67028 INJECTION EYE DRUG: CPT

## 2023-10-09 PROCEDURE — 92250 FUNDUS PHOTOGRAPHY W/I&R: CPT | Mod: 59

## 2023-10-09 PROCEDURE — 92134 CPTRZ OPH DX IMG PST SGM RTA: CPT

## 2023-11-16 ENCOUNTER — CLINIC PROCEDURE ONLY (OUTPATIENT)
Dept: URBAN - METROPOLITAN AREA CLINIC 26 | Facility: CLINIC | Age: 83
End: 2023-11-16

## 2023-11-16 DIAGNOSIS — E11.3313: ICD-10-CM

## 2023-11-16 DIAGNOSIS — Z79.4: ICD-10-CM

## 2023-11-16 PROCEDURE — Q5128DME CIMERLI 0.3MG

## 2023-11-16 PROCEDURE — 67028 INJECTION EYE DRUG: CPT

## 2023-11-16 PROCEDURE — 92134 CPTRZ OPH DX IMG PST SGM RTA: CPT

## 2023-12-21 ENCOUNTER — FOLLOW UP (OUTPATIENT)
Dept: URBAN - METROPOLITAN AREA CLINIC 26 | Facility: CLINIC | Age: 83
End: 2023-12-21

## 2023-12-21 DIAGNOSIS — Z79.4: ICD-10-CM

## 2023-12-21 DIAGNOSIS — H02.833: ICD-10-CM

## 2023-12-21 DIAGNOSIS — H02.836: ICD-10-CM

## 2023-12-21 DIAGNOSIS — H43.813: ICD-10-CM

## 2023-12-21 DIAGNOSIS — H40.1130: ICD-10-CM

## 2023-12-21 DIAGNOSIS — E11.3313: ICD-10-CM

## 2023-12-21 PROCEDURE — 92012 INTRM OPH EXAM EST PATIENT: CPT

## 2023-12-21 PROCEDURE — Q5128DME CIMERLI 0.3MG

## 2023-12-21 PROCEDURE — 92134 CPTRZ OPH DX IMG PST SGM RTA: CPT

## 2023-12-21 PROCEDURE — 92250 FUNDUS PHOTOGRAPHY W/I&R: CPT

## 2023-12-21 PROCEDURE — 67028 INJECTION EYE DRUG: CPT

## 2023-12-21 ASSESSMENT — VISUAL ACUITY
OD_CC: 20/30-1
OS_CC: 20/50-2

## 2023-12-21 ASSESSMENT — TONOMETRY
OS_IOP_MMHG: 10
OD_IOP_MMHG: 11

## 2024-02-01 ENCOUNTER — CLINIC PROCEDURE ONLY (OUTPATIENT)
Dept: URBAN - METROPOLITAN AREA CLINIC 26 | Facility: CLINIC | Age: 84
End: 2024-02-01

## 2024-02-01 DIAGNOSIS — E11.3313: ICD-10-CM

## 2024-02-01 DIAGNOSIS — Z79.4: ICD-10-CM

## 2024-02-01 PROCEDURE — 67028 INJECTION EYE DRUG: CPT

## 2024-02-01 PROCEDURE — 92250 FUNDUS PHOTOGRAPHY W/I&R: CPT

## 2024-02-01 PROCEDURE — Q5128DME CIMERLI 0.3MG

## 2024-02-01 PROCEDURE — 92134 CPTRZ OPH DX IMG PST SGM RTA: CPT

## 2024-03-07 ENCOUNTER — FOLLOW UP (OUTPATIENT)
Dept: URBAN - METROPOLITAN AREA CLINIC 26 | Facility: CLINIC | Age: 84
End: 2024-03-07

## 2024-03-07 DIAGNOSIS — H43.813: ICD-10-CM

## 2024-03-07 DIAGNOSIS — E11.3313: ICD-10-CM

## 2024-03-07 DIAGNOSIS — H02.836: ICD-10-CM

## 2024-03-07 DIAGNOSIS — H02.833: ICD-10-CM

## 2024-03-07 DIAGNOSIS — H40.1130: ICD-10-CM

## 2024-03-07 DIAGNOSIS — Z79.4: ICD-10-CM

## 2024-03-07 PROCEDURE — 92134 CPTRZ OPH DX IMG PST SGM RTA: CPT

## 2024-03-07 PROCEDURE — 92250 FUNDUS PHOTOGRAPHY W/I&R: CPT

## 2024-03-07 PROCEDURE — 92012 INTRM OPH EXAM EST PATIENT: CPT

## 2024-03-07 PROCEDURE — 67028 INJECTION EYE DRUG: CPT

## 2024-03-07 ASSESSMENT — VISUAL ACUITY
OD_CC: 20/40+2
OS_CC: 20/30-2

## 2024-03-07 ASSESSMENT — TONOMETRY
OD_IOP_MMHG: 13
OS_IOP_MMHG: 11

## 2024-04-11 ENCOUNTER — CLINIC PROCEDURE ONLY (OUTPATIENT)
Dept: URBAN - METROPOLITAN AREA CLINIC 26 | Facility: CLINIC | Age: 84
End: 2024-04-11

## 2024-04-11 DIAGNOSIS — Z79.4: ICD-10-CM

## 2024-04-11 DIAGNOSIS — E11.3313: ICD-10-CM

## 2024-04-11 PROCEDURE — 92250 FUNDUS PHOTOGRAPHY W/I&R: CPT | Mod: 59

## 2024-04-11 PROCEDURE — 92134 CPTRZ OPH DX IMG PST SGM RTA: CPT

## 2024-04-11 PROCEDURE — 67028 INJECTION EYE DRUG: CPT

## 2024-05-16 ENCOUNTER — CLINIC PROCEDURE ONLY (OUTPATIENT)
Dept: URBAN - METROPOLITAN AREA CLINIC 26 | Facility: CLINIC | Age: 84
End: 2024-05-16

## 2024-05-16 DIAGNOSIS — E11.3313: ICD-10-CM

## 2024-05-16 DIAGNOSIS — Z79.4: ICD-10-CM

## 2024-05-16 PROCEDURE — 92134 CPTRZ OPH DX IMG PST SGM RTA: CPT

## 2024-05-16 PROCEDURE — 92250 FUNDUS PHOTOGRAPHY W/I&R: CPT | Mod: 59

## 2024-05-16 PROCEDURE — 67028 INJECTION EYE DRUG: CPT

## 2024-06-25 ENCOUNTER — CLINIC PROCEDURE ONLY (OUTPATIENT)
Dept: URBAN - METROPOLITAN AREA CLINIC 26 | Facility: CLINIC | Age: 84
End: 2024-06-25

## 2024-06-25 DIAGNOSIS — E11.3313: ICD-10-CM

## 2024-06-25 DIAGNOSIS — Z79.4: ICD-10-CM

## 2024-06-25 PROCEDURE — 92250 FUNDUS PHOTOGRAPHY W/I&R: CPT | Mod: 59

## 2024-06-25 PROCEDURE — 67028 INJECTION EYE DRUG: CPT

## 2024-06-25 PROCEDURE — 92134 CPTRZ OPH DX IMG PST SGM RTA: CPT

## 2024-06-25 ASSESSMENT — VISUAL ACUITY: OS_SC: 20/40

## 2024-06-25 ASSESSMENT — TONOMETRY: OS_IOP_MMHG: 19

## 2024-07-31 ENCOUNTER — FOLLOW UP (OUTPATIENT)
Dept: URBAN - METROPOLITAN AREA CLINIC 26 | Facility: CLINIC | Age: 84
End: 2024-07-31

## 2024-07-31 VITALS — WEIGHT: 168 LBS | HEIGHT: 64 IN | BODY MASS INDEX: 28.68 KG/M2

## 2024-07-31 DIAGNOSIS — Z79.4: ICD-10-CM

## 2024-07-31 DIAGNOSIS — E11.3313: ICD-10-CM

## 2024-07-31 DIAGNOSIS — H40.1130: ICD-10-CM

## 2024-07-31 DIAGNOSIS — H02.836: ICD-10-CM

## 2024-07-31 DIAGNOSIS — H02.833: ICD-10-CM

## 2024-07-31 DIAGNOSIS — Z96.1: ICD-10-CM

## 2024-07-31 DIAGNOSIS — H43.813: ICD-10-CM

## 2024-07-31 PROCEDURE — 67028 INJECTION EYE DRUG: CPT

## 2024-07-31 PROCEDURE — 92250 FUNDUS PHOTOGRAPHY W/I&R: CPT

## 2024-07-31 PROCEDURE — 92134 CPTRZ OPH DX IMG PST SGM RTA: CPT

## 2024-07-31 PROCEDURE — 92014 COMPRE OPH EXAM EST PT 1/>: CPT

## 2024-07-31 ASSESSMENT — VISUAL ACUITY
OS_SC: 20/40
OD_SC: 20/50+1

## 2024-07-31 ASSESSMENT — TONOMETRY
OD_IOP_MMHG: 14
OS_IOP_MMHG: 14

## 2024-09-06 ENCOUNTER — CLINIC PROCEDURE ONLY (OUTPATIENT)
Dept: URBAN - METROPOLITAN AREA CLINIC 26 | Facility: CLINIC | Age: 84
End: 2024-09-06

## 2024-09-06 DIAGNOSIS — E11.3313: ICD-10-CM

## 2024-09-06 DIAGNOSIS — Z79.4: ICD-10-CM

## 2024-09-06 PROCEDURE — 67028 INJECTION EYE DRUG: CPT

## 2024-09-06 PROCEDURE — 92250 FUNDUS PHOTOGRAPHY W/I&R: CPT

## 2024-09-06 PROCEDURE — 92134 CPTRZ OPH DX IMG PST SGM RTA: CPT

## 2024-10-17 ENCOUNTER — CLINIC PROCEDURE ONLY (OUTPATIENT)
Dept: URBAN - METROPOLITAN AREA CLINIC 26 | Facility: CLINIC | Age: 84
End: 2024-10-17

## 2024-10-17 DIAGNOSIS — Z79.4: ICD-10-CM

## 2024-10-17 DIAGNOSIS — E11.3313: ICD-10-CM

## 2024-10-17 PROCEDURE — J2777PFS VABYSMO PFS: Mod: JZ,LT

## 2024-10-17 PROCEDURE — 92134 CPTRZ OPH DX IMG PST SGM RTA: CPT

## 2024-10-17 PROCEDURE — 67028 INJECTION EYE DRUG: CPT

## 2024-10-17 PROCEDURE — 92250 FUNDUS PHOTOGRAPHY W/I&R: CPT

## 2024-11-21 ENCOUNTER — CLINIC PROCEDURE ONLY (OUTPATIENT)
Dept: URBAN - METROPOLITAN AREA CLINIC 26 | Facility: CLINIC | Age: 84
End: 2024-11-21

## 2024-11-21 DIAGNOSIS — E11.3313: ICD-10-CM

## 2024-11-21 DIAGNOSIS — Z79.4: ICD-10-CM

## 2024-11-21 PROCEDURE — 67028 INJECTION EYE DRUG: CPT

## 2024-11-21 PROCEDURE — J2777PFS VABYSMO PFS: Mod: JZ

## 2024-11-21 PROCEDURE — 92250 FUNDUS PHOTOGRAPHY W/I&R: CPT

## 2024-11-21 PROCEDURE — 92134 CPTRZ OPH DX IMG PST SGM RTA: CPT

## 2024-12-26 ENCOUNTER — CLINIC PROCEDURE ONLY (OUTPATIENT)
Age: 84
End: 2024-12-26

## 2024-12-26 DIAGNOSIS — E11.3313: ICD-10-CM

## 2024-12-26 DIAGNOSIS — Z79.4: ICD-10-CM

## 2024-12-26 PROCEDURE — 92250 FUNDUS PHOTOGRAPHY W/I&R: CPT

## 2024-12-26 PROCEDURE — 67028 INJECTION EYE DRUG: CPT

## 2024-12-26 PROCEDURE — J2777PFS VABYSMO PFS: Mod: JZ,LT

## 2025-01-30 ENCOUNTER — CLINIC PROCEDURE ONLY (OUTPATIENT)
Age: 85
End: 2025-01-30

## 2025-01-30 DIAGNOSIS — Z79.4: ICD-10-CM

## 2025-01-30 DIAGNOSIS — E11.3313: ICD-10-CM

## 2025-01-30 PROCEDURE — 92134 CPTRZ OPH DX IMG PST SGM RTA: CPT

## 2025-01-30 PROCEDURE — 67028 INJECTION EYE DRUG: CPT

## 2025-01-30 PROCEDURE — 92250 FUNDUS PHOTOGRAPHY W/I&R: CPT | Mod: 59

## 2025-01-30 PROCEDURE — J2777PFS VABYSMO PFS: Mod: JZ

## 2025-03-13 ENCOUNTER — CLINIC PROCEDURE ONLY (OUTPATIENT)
Age: 85
End: 2025-03-13

## 2025-03-13 DIAGNOSIS — Z79.4: ICD-10-CM

## 2025-03-13 DIAGNOSIS — E11.3313: ICD-10-CM

## 2025-03-13 PROCEDURE — 67028 INJECTION EYE DRUG: CPT

## 2025-03-13 PROCEDURE — 92134 CPTRZ OPH DX IMG PST SGM RTA: CPT

## 2025-03-13 PROCEDURE — 92250 FUNDUS PHOTOGRAPHY W/I&R: CPT | Mod: 59

## 2025-03-13 PROCEDURE — J2777PFS VABYSMO PFS: Mod: JZ

## 2025-04-26 ENCOUNTER — CLINIC PROCEDURE ONLY (OUTPATIENT)
Age: 85
End: 2025-04-26

## 2025-04-26 DIAGNOSIS — Z79.4: ICD-10-CM

## 2025-04-26 DIAGNOSIS — E11.3313: ICD-10-CM

## 2025-04-26 PROCEDURE — J2777PFS VABYSMO PFS: Mod: JZ

## 2025-04-26 PROCEDURE — 67028 INJECTION EYE DRUG: CPT

## 2025-04-26 PROCEDURE — 92250 FUNDUS PHOTOGRAPHY W/I&R: CPT

## 2025-04-26 PROCEDURE — 92134 CPTRZ OPH DX IMG PST SGM RTA: CPT

## 2025-06-06 ENCOUNTER — CLINIC PROCEDURE ONLY (OUTPATIENT)
Age: 85
End: 2025-06-06

## 2025-06-06 DIAGNOSIS — Z79.4: ICD-10-CM

## 2025-06-06 DIAGNOSIS — E11.3313: ICD-10-CM

## 2025-06-06 PROCEDURE — 92134 CPTRZ OPH DX IMG PST SGM RTA: CPT

## 2025-06-06 PROCEDURE — 92250 FUNDUS PHOTOGRAPHY W/I&R: CPT | Mod: 59

## 2025-06-06 PROCEDURE — J2777PFS VABYSMO PFS: Mod: JZ,LT

## 2025-06-06 PROCEDURE — 67028 INJECTION EYE DRUG: CPT

## 2025-07-24 ENCOUNTER — CLINIC PROCEDURE ONLY (OUTPATIENT)
Age: 85
End: 2025-07-24

## 2025-07-24 DIAGNOSIS — E11.3313: ICD-10-CM

## 2025-07-24 DIAGNOSIS — Z79.4: ICD-10-CM

## 2025-07-24 PROCEDURE — 92134 CPTRZ OPH DX IMG PST SGM RTA: CPT

## 2025-07-24 PROCEDURE — 67028 INJECTION EYE DRUG: CPT

## 2025-07-24 PROCEDURE — J2777PFS VABYSMO PFS: Mod: JZ

## 2025-07-24 PROCEDURE — 92250 FUNDUS PHOTOGRAPHY W/I&R: CPT | Mod: 59
